# Patient Record
Sex: MALE | URBAN - METROPOLITAN AREA
[De-identification: names, ages, dates, MRNs, and addresses within clinical notes are randomized per-mention and may not be internally consistent; named-entity substitution may affect disease eponyms.]

---

## 2017-10-20 ENCOUNTER — IMPORTED ENCOUNTER (OUTPATIENT)
Dept: URBAN - METROPOLITAN AREA CLINIC 9 | Facility: CLINIC | Age: 82
End: 2017-10-20

## 2017-10-27 ENCOUNTER — IMPORTED ENCOUNTER (OUTPATIENT)
Dept: URBAN - METROPOLITAN AREA CLINIC 9 | Facility: CLINIC | Age: 82
End: 2017-10-27

## 2017-11-09 ENCOUNTER — IMPORTED ENCOUNTER (OUTPATIENT)
Dept: URBAN - METROPOLITAN AREA CLINIC 9 | Facility: CLINIC | Age: 82
End: 2017-11-09

## 2017-11-17 ENCOUNTER — IMPORTED ENCOUNTER (OUTPATIENT)
Dept: URBAN - METROPOLITAN AREA CLINIC 9 | Facility: CLINIC | Age: 82
End: 2017-11-17

## 2017-11-20 ENCOUNTER — IMPORTED ENCOUNTER (OUTPATIENT)
Dept: URBAN - METROPOLITAN AREA CLINIC 9 | Facility: CLINIC | Age: 82
End: 2017-11-20

## 2017-11-29 ENCOUNTER — IMPORTED ENCOUNTER (OUTPATIENT)
Dept: URBAN - METROPOLITAN AREA CLINIC 9 | Facility: CLINIC | Age: 82
End: 2017-11-29

## 2017-12-07 ENCOUNTER — IMPORTED ENCOUNTER (OUTPATIENT)
Dept: URBAN - METROPOLITAN AREA CLINIC 9 | Facility: CLINIC | Age: 82
End: 2017-12-07

## 2017-12-28 ENCOUNTER — IMPORTED ENCOUNTER (OUTPATIENT)
Dept: URBAN - METROPOLITAN AREA CLINIC 9 | Facility: CLINIC | Age: 82
End: 2017-12-28

## 2018-06-28 ENCOUNTER — IMPORTED ENCOUNTER (OUTPATIENT)
Dept: URBAN - METROPOLITAN AREA CLINIC 9 | Facility: CLINIC | Age: 83
End: 2018-06-28

## 2019-11-14 NOTE — PATIENT DISCUSSION
Monitor.  try Systane Complete QID OU needs to space out over the course of the day - waiting too long typically puts tears in starting in the evening.

## 2021-10-16 ASSESSMENT — VISUAL ACUITY
OD_SC: 20/200 SN
OD_SC: 20/40 -2 SN
OD_SC: 20/200 SN
OS_SC: 20/50 + SN
OD_SC: 20/400 SN
OS_SC: 20/40 -2 SN
OS_SC: 20/400 SN
OD_SC: 20/50 + SN
OS_SC: 20/50 +2 SN
OD_SC: 20/40 -2 SN
OS_SC: 20/50 - SN
OS_SC: 20/40 SN
OD_SC: 20/400 SN
OS_SC: 20/70 SN
OD_SC: 20/400 SN
OS_SC: 20/400 SN
OS_SC: 20/50 - SN
OD_SC: 20/200 SN

## 2021-10-16 ASSESSMENT — TONOMETRY
OD_IOP_MMHG: 10
OS_IOP_MMHG: 11
OS_IOP_MMHG: 10
OS_IOP_MMHG: 12
OD_IOP_MMHG: 14
OD_IOP_MMHG: 11
OS_IOP_MMHG: 19
OD_IOP_MMHG: 10
OS_IOP_MMHG: 10
OD_IOP_MMHG: 10
OS_IOP_MMHG: 11
OS_IOP_MMHG: 10
OD_IOP_MMHG: 16
OD_IOP_MMHG: 8
OS_IOP_MMHG: 24
OD_IOP_MMHG: 12